# Patient Record
Sex: MALE | Race: WHITE | URBAN - METROPOLITAN AREA
[De-identification: names, ages, dates, MRNs, and addresses within clinical notes are randomized per-mention and may not be internally consistent; named-entity substitution may affect disease eponyms.]

---

## 2018-06-23 ENCOUNTER — TELEPHONE (OUTPATIENT)
Dept: FAMILY MEDICINE | Facility: CLINIC | Age: 28
End: 2018-06-23

## 2018-06-23 NOTE — TELEPHONE ENCOUNTER
6/23/2018    Call Regarding ReattributionPhysical       Attempt 1    Message on voicemail     Comments:       Outreach   SV

## 2018-06-30 NOTE — TELEPHONE ENCOUNTER
6/30/2018    Call Regarding ReattributionPhysical    Attempt 2    Message on voicemail     Comments:       Outreach   SB

## 2018-07-19 NOTE — TELEPHONE ENCOUNTER
7/19/2018    Call Regarding ReattributionPhysical    Attempt 3    Message on voicemail    Comments:       Outreach   Aura Hall

## 2021-05-28 ENCOUNTER — RECORDS - HEALTHEAST (OUTPATIENT)
Dept: ADMINISTRATIVE | Facility: CLINIC | Age: 31
End: 2021-05-28